# Patient Record
Sex: FEMALE | Race: WHITE | HISPANIC OR LATINO | URBAN - METROPOLITAN AREA
[De-identification: names, ages, dates, MRNs, and addresses within clinical notes are randomized per-mention and may not be internally consistent; named-entity substitution may affect disease eponyms.]

---

## 2024-01-12 ENCOUNTER — OFFICE VISIT (OUTPATIENT)
Dept: URBAN - METROPOLITAN AREA CLINIC 63 | Facility: CLINIC | Age: 54
End: 2024-01-12
Payer: COMMERCIAL

## 2024-01-12 ENCOUNTER — LAB OUTSIDE AN ENCOUNTER (OUTPATIENT)
Dept: URBAN - METROPOLITAN AREA CLINIC 63 | Facility: CLINIC | Age: 54
End: 2024-01-12

## 2024-01-12 VITALS
DIASTOLIC BLOOD PRESSURE: 80 MMHG | HEART RATE: 77 BPM | WEIGHT: 154 LBS | SYSTOLIC BLOOD PRESSURE: 120 MMHG | HEIGHT: 61 IN | TEMPERATURE: 97.2 F | BODY MASS INDEX: 29.07 KG/M2 | OXYGEN SATURATION: 96 %

## 2024-01-12 DIAGNOSIS — R10.13 EPIGASTRIC ABDOMINAL PAIN: ICD-10-CM

## 2024-01-12 DIAGNOSIS — R11.2 NAUSEA AND VOMITING IN ADULT: ICD-10-CM

## 2024-01-12 PROCEDURE — 99204 OFFICE O/P NEW MOD 45 MIN: CPT | Performed by: NURSE PRACTITIONER

## 2024-01-12 RX ORDER — ROSUVASTATIN CALCIUM 10 MG/1
TAKE ONE TABLET BY MOUTH ONE TIME DAILY TABLET, COATED ORAL
Qty: 30 UNSPECIFIED | Refills: 0 | Status: ACTIVE | COMMUNITY

## 2024-01-12 RX ORDER — SUCRALFATE 1 G/1
DISSOLVE 1 TABLET IN 2-3 OUNCES OF WATER TABLET ORAL
Qty: 120 TABLET | Refills: 1 | OUTPATIENT
Start: 2024-01-12 | End: 2024-03-12

## 2024-01-12 RX ORDER — PANTOPRAZOLE SODIUM 40 MG/1
TAKE ONE TABLET BY MOUTH ONE TIME DAILY TABLET, DELAYED RELEASE ORAL
Qty: 30 UNSPECIFIED | Refills: 0 | Status: ACTIVE | COMMUNITY

## 2024-01-12 RX ORDER — TEMAZEPAM 15 MG/1
TAKE ONE CAPSULE BY MOUTH ONE TIME DAILY AT BEDTIME CAPSULE ORAL
Qty: 30 UNSPECIFIED | Refills: 0 | Status: ACTIVE | COMMUNITY

## 2024-01-12 NOTE — HPI-TODAY'S VISIT:
Thank you very much for kindly referring Susan Back, very pleasant 53-year-old female, to our service for ER follow-up from yesterday due to nausea and vomiting.  Past medical history significant for hypertension, hyperlipidemia, migraines, DVT, asthma, pneumonia, sleep apnea, obesity, H. pylori gastritis and GERD.  Past surgical history significant for gastric sleeve, cholecystectomy, tubal ligation and breast reduction.She was seen yesterday at [facility] ED and was treated with a GI cocktail with a final diagnosis of GERD.  No further workup was performed due to relaying office visit with me today (see ED provider note summary below).  Her last EGD was 29 January 2021 with Dr. Mccabe and demonstrated gastric sleeve and antral gastritis.  Her last colonoscopy was 12 November 2020 with Dr. Paris and was normal with grade 1 internal hemorrhoids.  Susan presents today complaining of epigastric pain with her right upper quadrant, "knot" that results in daily episodes of nausea and vomiting. Symptoms began approximately 3 months ago and she relates that Zofran does help with her nausea. She currently uses pantoprazole, 40 mg, once daily with only moderate efficacy and in contradiction to the ED provider note, states that the GI cocktail yesterday was minimally beneficial. She is otherwise asymptomatic from a GI perspective and relates 1 bowel movement per day, typically in the morning after coffee. She denies use of NSAIDs, marijuana, pyrosis, dysphagia, change in bowel habits, rectal bleeding, melena or unintentional weight loss.  **********  As per [facility] ED provider note dated 11 November 2024 (Jorge Mohan MD): "The patient is a 53 y.o. female with past medical history of asthma, hypertension who presents to the emergency department via Walk-in with a chief complaint/evaluation of abdominal pain. For a few months, the patient has had epigastric abdominal pain described as a "burning sensation". Patient came into the ED today because she could not stand the pain at work. Takes medication for acid reflux. Patient has an appointment with a GI doctor tomorrow. History of gallbladder removal. Denies chest pain and shortness of breath. ED Course: I ordered a GI cocktail and Protonix for symptom relief.  Upon reevaluation, the patient was resting comfortably with no further complaints. I will be discharging the patient with a diagnosis of GERD. The patient has been given strict return precautions. Management/Reassessment: Upon re-evaluation, the patient was resting comfortably and in no acute distress. I discussed the test results and treatment plan with the patient. I informed the patient that outpatient follow-up is reasonable at this time. The patient will be discharged for outpatient follow up. Patient agrees to follow-up outpatient. Refer to follow-up information below for details. Patient given written and verbal discharge instructions, verbalizes understanding, and agrees to return to the ED for any new or worsening symptoms.  I also advised the patient to return if they would simply like to be re-evaluated.  See further counseling below. Impression/Diagnosis(es): The encounter diagnosis was Gastroesophageal reflux disease, unspecified whether esophagitis present."

## 2024-01-12 NOTE — HPI-PREVIOUS PROCEDURES
EGD/29 January 2021 (Madi Mccabe MD).  Status post gastric sleeve surgery/gastritis of the antrum. ********** EGD and colonoscopy/12 November 2020 (Harpal Paris MD).  1.  Very mild gastritis, gastric antral and duodenal biopsy done.  2.  Grade 1 internal hemorrhoids and external hemorrhoids, otherwise normal colonoscopy.  3.  Random colon and terminal ileum biopsies done due to history of chronic diarrhea.PATHOLOGY: Mild chronic inactive gastritis in the antral biopsy.  All remaining findings are negative or benign.

## 2024-01-12 NOTE — HPI-PREVIOUS LABS
Lab work dated 11 January 2024 demonstrates following abnormalities: AST 38, leukocyte esterase trace, urine WBC 3-5, urine bacteria occasional, urine hyaline casts 0-2. All remaining lab values of CBC, CMP, lipase, influenza AMB screen, urinalysis and urine hCG are negative or within normal limits. ********** Lab work dated 30 November 2023 demonstrates the following abnormalities: Cholesterol 269, triglycerides 166, , non-. All remaining lab values of CBC, CMP, TSH, testosterone, FSH, estradiol, B12 and lipid panel are within normal limits. ********** Lab work dated 13 October 2023 demonstrates the following abnormalities: CO2 31, total protein 8.3. All remaining lab values of CBC, CMP, PT/INR and troponin 1 are negative or within normal limits.

## 2024-01-19 ENCOUNTER — LAB OUTSIDE AN ENCOUNTER (OUTPATIENT)
Dept: URBAN - METROPOLITAN AREA CLINIC 63 | Facility: CLINIC | Age: 54
End: 2024-01-19

## 2024-02-21 ENCOUNTER — EGD (OUTPATIENT)
Dept: URBAN - METROPOLITAN AREA SURGERY CENTER 4 | Facility: SURGERY CENTER | Age: 54
End: 2024-02-21
Payer: COMMERCIAL

## 2024-02-21 ENCOUNTER — LAB (OUTPATIENT)
Dept: URBAN - METROPOLITAN AREA CLINIC 4 | Facility: CLINIC | Age: 54
End: 2024-02-21
Payer: COMMERCIAL

## 2024-02-21 DIAGNOSIS — K29.70 GASTRITIS, UNSPECIFIED, WITHOUT BLEEDING: ICD-10-CM

## 2024-02-21 DIAGNOSIS — Z90.3 HISTORY OF SLEEVE GASTRECTOMY: ICD-10-CM

## 2024-02-21 DIAGNOSIS — K31.89 OTHER DISEASES OF STOMACH AND DUODENUM: ICD-10-CM

## 2024-02-21 DIAGNOSIS — K44.9 DIAPHRAGMATIC HERNIA WITHOUT OBSTRUCTION OR GANGRENE: ICD-10-CM

## 2024-02-21 DIAGNOSIS — K29.70 GASTRITIS WITHOUT BLEEDING, UNSPECIFIED CHRONICITY, UNSPECIFIED GASTRITIS TYPE: ICD-10-CM

## 2024-02-21 PROCEDURE — 88305 TISSUE EXAM BY PATHOLOGIST: CPT | Performed by: PATHOLOGY

## 2024-02-21 PROCEDURE — 43239 EGD BIOPSY SINGLE/MULTIPLE: CPT | Performed by: INTERNAL MEDICINE

## 2024-02-21 PROCEDURE — 88312 SPECIAL STAINS GROUP 1: CPT | Performed by: PATHOLOGY

## 2024-02-21 RX ORDER — ROSUVASTATIN CALCIUM 10 MG/1
TAKE ONE TABLET BY MOUTH ONE TIME DAILY TABLET, COATED ORAL
Qty: 30 UNSPECIFIED | Refills: 0 | Status: ACTIVE | COMMUNITY

## 2024-02-21 RX ORDER — SUCRALFATE 1 G/1
DISSOLVE 1 TABLET IN 2-3 OUNCES OF WATER TABLET ORAL
Qty: 120 TABLET | Refills: 1 | Status: ACTIVE | COMMUNITY
Start: 2024-01-12 | End: 2024-03-12

## 2024-02-21 RX ORDER — PANTOPRAZOLE SODIUM 40 MG/1
TAKE ONE TABLET BY MOUTH ONE TIME DAILY TABLET, DELAYED RELEASE ORAL
Qty: 30 UNSPECIFIED | Refills: 0 | Status: ACTIVE | COMMUNITY

## 2024-02-21 RX ORDER — TEMAZEPAM 15 MG/1
TAKE ONE CAPSULE BY MOUTH ONE TIME DAILY AT BEDTIME CAPSULE ORAL
Qty: 30 UNSPECIFIED | Refills: 0 | Status: ACTIVE | COMMUNITY

## 2024-03-08 ENCOUNTER — OV EP (OUTPATIENT)
Dept: URBAN - METROPOLITAN AREA CLINIC 63 | Facility: CLINIC | Age: 54
End: 2024-03-08

## 2024-03-08 RX ORDER — PANTOPRAZOLE SODIUM 40 MG/1
TAKE ONE TABLET BY MOUTH ONE TIME DAILY TABLET, DELAYED RELEASE ORAL
Qty: 30 UNSPECIFIED | Refills: 0 | COMMUNITY

## 2024-03-08 RX ORDER — ROSUVASTATIN CALCIUM 10 MG/1
TAKE ONE TABLET BY MOUTH ONE TIME DAILY TABLET, COATED ORAL
Qty: 30 UNSPECIFIED | Refills: 0 | COMMUNITY

## 2024-03-08 RX ORDER — TEMAZEPAM 15 MG/1
TAKE ONE CAPSULE BY MOUTH ONE TIME DAILY AT BEDTIME CAPSULE ORAL
Qty: 30 UNSPECIFIED | Refills: 0 | COMMUNITY

## 2024-03-08 RX ORDER — SUCRALFATE 1 G/1
DISSOLVE 1 TABLET IN 2-3 OUNCES OF WATER TABLET ORAL
Qty: 120 TABLET | Refills: 1 | COMMUNITY
Start: 2024-01-12 | End: 2024-03-12

## 2024-03-08 NOTE — HPI-TODAY'S VISIT:
Thank you very much for kindly referring Susan Back, very pleasant 53-year-old female, to our service for ER follow-up from yesterday due to nausea and vomiting.  Past medical history significant for hypertension, hyperlipidemia, migraines, DVT, asthma, pneumonia, sleep apnea, obesity, H. pylori gastritis and GERD.  Past surgical history significant for gastric sleeve, cholecystectomy, tubal ligation and breast reduction.She was seen yesterday at [facility] ED and was treated with a GI cocktail with a final diagnosis of GERD.  No further workup was performed due to relaying office visit with me today (see ED provider note summary below).  Her last EGD was 29 January 2021 with Dr. Mccabe and demonstrated gastric sleeve and antral gastritis.  Her last colonoscopy was 12 November 2020 with Dr. Paris and was normal with grade 1 internal hemorrhoids.  Susan presents today complaining of epigastric pain with her right upper quadrant, "knot" that results in daily episodes of nausea and vomiting. Symptoms began approximately 3 months ago and she relates that Zofran does help with her nausea. She currently uses pantoprazole, 40 mg, once daily with only moderate efficacy and in contradiction to the ED provider note, states that the GI cocktail yesterday was minimally beneficial. She is otherwise asymptomatic from a GI perspective and relates 1 bowel movement per day, typically in the morning after coffee. She denies use of NSAIDs, marijuana, pyrosis, dysphagia, change in bowel habits, rectal bleeding, melena or unintentional weight loss.  PLAN: Susan's physical exam is significant for epigastric tenderness to deep palpation, but otherwise unremarkable. Differential diagnosis includes GERD versus gastroduodenitis versus peptic ulcer disease versus H. pylori gastritis. EGD is indicated due to >2 months use of prescription strength PPI and continued symptoms. I have added sucralfate, 1 g, 4 times daily prior to EGD for evaluation. Additionally, I have ordered an upper GI series with tablet prior to procedure due to ongoing nausea. Procedure details were discussed in depth and Susan is in agreement with this assessment, schedule and plan.. Thank you again for kindly referring this very pleasant woman to our service.  **********  As per [facility] ED provider note dated 11 November 2024 (Jorge Mohan MD): "The patient is a 53 y.o. female with past medical history of asthma, hypertension who presents to the emergency department via Walk-in with a chief complaint/evaluation of abdominal pain. For a few months, the patient has had epigastric abdominal pain described as a "burning sensation". Patient came into the ED today because she could not stand the pain at work. Takes medication for acid reflux. Patient has an appointment with a GI doctor tomorrow. History of gallbladder removal. Denies chest pain and shortness of breath. ED Course: I ordered a GI cocktail and Protonix for symptom relief.  Upon reevaluation, the patient was resting comfortably with no further complaints. I will be discharging the patient with a diagnosis of GERD. The patient has been given strict return precautions. Management/Reassessment: Upon re-evaluation, the patient was resting comfortably and in no acute distress. I discussed the test results and treatment plan with the patient. I informed the patient that outpatient follow-up is reasonable at this time. The patient will be discharged for outpatient follow up. Patient agrees to follow-up outpatient. Refer to follow-up information below for details. Patient given written and verbal discharge instructions, verbalizes understanding, and agrees to return to the ED for any new or worsening symptoms.  I also advised the patient to return if they would simply like to be re-evaluated.  See further counseling below. Impression/Diagnosis(es): The encounter diagnosis was Gastroesophageal reflux disease, unspecified whether esophagitis present."

## 2024-03-08 NOTE — HPI-PREVIOUS IMAGING
XR abdomen-KUB/16 February 2024.  Constipation. ********** CT abdomen and pelvis without contrast/21 June 2022. No acute abnormality.

## 2024-03-08 NOTE — HPI-PREVIOUS PROCEDURES
EGD/21 February 2024. Unremarkable esophagus. A 4 cm hiatal hernia was present. Evidence of a sleeve gastrectomy was found in the gastric body. Minimal gastritis found in the gastric antrum. Unremarkable duodenum. Pathology demonstrates chemical-reactive gastropathy in the antral biopsy and reflux type changes in the lower third esophageal biopsy. All remaining findings are negative or benign. ********** EGD/29 January 2021 (Madi Mccabe MD).  Status post gastric sleeve surgery/gastritis of the antrum. ********** EGD and colonoscopy/12 November 2020 (Harpal Paris MD).  1.  Very mild gastritis, gastric antral and duodenal biopsy done.  2.  Grade 1 internal hemorrhoids and external hemorrhoids, otherwise normal colonoscopy.  3.  Random colon and terminal ileum biopsies done due to history of chronic diarrhea. PATHOLOGY: Mild chronic inactive gastritis in the antral biopsy.  All remaining findings are negative or benign. .

## 2024-03-08 NOTE — HPI-PREVIOUS LABS
Lab work dated 15 February 2024 demonstrates the following abnormalities: Total protein 8.7. All remaining lab values of CBC, CMP, lipase and urinalysis are negative or within normal limits. ********** Lab work dated 11 January 2024 demonstrates following abnormalities: AST 38, leukocyte esterase trace, urine WBC 3-5, urine bacteria occasional, urine hyaline casts 0-2. All remaining lab values of CBC, CMP, lipase, influenza AMB screen, urinalysis and urine hCG are negative or within normal limits. ********** Lab work dated 30 November 2023 demonstrates the following abnormalities: Cholesterol 269, triglycerides 166, , non-. All remaining lab values of CBC, CMP, TSH, testosterone, FSH, estradiol, B12 and lipid panel are within normal limits. ********** Lab work dated 13 October 2023 demonstrates the following abnormalities: CO2 31, total protein 8.3. All remaining lab values of CBC, CMP, PT/INR and troponin 1 are negative or within normal limits.